# Patient Record
Sex: MALE | Race: ASIAN | NOT HISPANIC OR LATINO | URBAN - METROPOLITAN AREA
[De-identification: names, ages, dates, MRNs, and addresses within clinical notes are randomized per-mention and may not be internally consistent; named-entity substitution may affect disease eponyms.]

---

## 2018-07-01 ENCOUNTER — OUTPATIENT (OUTPATIENT)
Dept: OUTPATIENT SERVICES | Facility: HOSPITAL | Age: 63
LOS: 1 days | End: 2018-07-01
Payer: MEDICAID

## 2018-07-01 DIAGNOSIS — Z98.890 OTHER SPECIFIED POSTPROCEDURAL STATES: Chronic | ICD-10-CM

## 2018-07-01 DIAGNOSIS — Z95.5 PRESENCE OF CORONARY ANGIOPLASTY IMPLANT AND GRAFT: Chronic | ICD-10-CM

## 2018-07-01 PROCEDURE — G9001: CPT

## 2018-07-11 ENCOUNTER — EMERGENCY (EMERGENCY)
Facility: HOSPITAL | Age: 63
LOS: 1 days | Discharge: HOME | End: 2018-07-11
Attending: EMERGENCY MEDICINE | Admitting: EMERGENCY MEDICINE
Payer: COMMERCIAL

## 2018-07-11 VITALS
SYSTOLIC BLOOD PRESSURE: 149 MMHG | DIASTOLIC BLOOD PRESSURE: 83 MMHG | OXYGEN SATURATION: 99 % | RESPIRATION RATE: 16 BRPM | HEART RATE: 68 BPM | TEMPERATURE: 98 F

## 2018-07-11 DIAGNOSIS — Z98.890 OTHER SPECIFIED POSTPROCEDURAL STATES: Chronic | ICD-10-CM

## 2018-07-11 DIAGNOSIS — Z95.5 PRESENCE OF CORONARY ANGIOPLASTY IMPLANT AND GRAFT: Chronic | ICD-10-CM

## 2018-07-11 LAB
ALBUMIN SERPL ELPH-MCNC: 4 G/DL — SIGNIFICANT CHANGE UP (ref 3.3–5)
ALP SERPL-CCNC: 100 U/L — SIGNIFICANT CHANGE UP (ref 40–120)
ALT FLD-CCNC: 24 U/L — SIGNIFICANT CHANGE UP (ref 4–41)
APTT BLD: 30.4 SEC — SIGNIFICANT CHANGE UP (ref 27.5–37.4)
AST SERPL-CCNC: 17 U/L — SIGNIFICANT CHANGE UP (ref 4–40)
BASOPHILS # BLD AUTO: 0.06 K/UL — SIGNIFICANT CHANGE UP (ref 0–0.2)
BASOPHILS NFR BLD AUTO: 0.7 % — SIGNIFICANT CHANGE UP (ref 0–2)
BILIRUB SERPL-MCNC: 0.7 MG/DL — SIGNIFICANT CHANGE UP (ref 0.2–1.2)
BLD GP AB SCN SERPL QL: NEGATIVE — SIGNIFICANT CHANGE UP
BUN SERPL-MCNC: 25 MG/DL — HIGH (ref 7–23)
CALCIUM SERPL-MCNC: 9.1 MG/DL — SIGNIFICANT CHANGE UP (ref 8.4–10.5)
CHLORIDE SERPL-SCNC: 98 MMOL/L — SIGNIFICANT CHANGE UP (ref 98–107)
CO2 SERPL-SCNC: 27 MMOL/L — SIGNIFICANT CHANGE UP (ref 22–31)
CREAT SERPL-MCNC: 1.2 MG/DL — SIGNIFICANT CHANGE UP (ref 0.5–1.3)
EOSINOPHIL # BLD AUTO: 0.24 K/UL — SIGNIFICANT CHANGE UP (ref 0–0.5)
EOSINOPHIL NFR BLD AUTO: 2.8 % — SIGNIFICANT CHANGE UP (ref 0–6)
GLUCOSE SERPL-MCNC: 91 MG/DL — SIGNIFICANT CHANGE UP (ref 70–99)
HCT VFR BLD CALC: 37.3 % — LOW (ref 39–50)
HGB BLD-MCNC: 12.9 G/DL — LOW (ref 13–17)
IMM GRANULOCYTES # BLD AUTO: 0.03 # — SIGNIFICANT CHANGE UP
IMM GRANULOCYTES NFR BLD AUTO: 0.4 % — SIGNIFICANT CHANGE UP (ref 0–1.5)
INR BLD: 1.03 — SIGNIFICANT CHANGE UP (ref 0.88–1.17)
LYMPHOCYTES # BLD AUTO: 1.47 K/UL — SIGNIFICANT CHANGE UP (ref 1–3.3)
LYMPHOCYTES # BLD AUTO: 17.2 % — SIGNIFICANT CHANGE UP (ref 13–44)
MCHC RBC-ENTMCNC: 28.8 PG — SIGNIFICANT CHANGE UP (ref 27–34)
MCHC RBC-ENTMCNC: 34.6 % — SIGNIFICANT CHANGE UP (ref 32–36)
MCV RBC AUTO: 83.3 FL — SIGNIFICANT CHANGE UP (ref 80–100)
MONOCYTES # BLD AUTO: 0.79 K/UL — SIGNIFICANT CHANGE UP (ref 0–0.9)
MONOCYTES NFR BLD AUTO: 9.2 % — SIGNIFICANT CHANGE UP (ref 2–14)
NEUTROPHILS # BLD AUTO: 5.97 K/UL — SIGNIFICANT CHANGE UP (ref 1.8–7.4)
NEUTROPHILS NFR BLD AUTO: 69.7 % — SIGNIFICANT CHANGE UP (ref 43–77)
NRBC # FLD: 0 — SIGNIFICANT CHANGE UP
PLATELET # BLD AUTO: 182 K/UL — SIGNIFICANT CHANGE UP (ref 150–400)
PMV BLD: 10.6 FL — SIGNIFICANT CHANGE UP (ref 7–13)
POTASSIUM SERPL-MCNC: 3.4 MMOL/L — LOW (ref 3.5–5.3)
POTASSIUM SERPL-SCNC: 3.4 MMOL/L — LOW (ref 3.5–5.3)
PROT SERPL-MCNC: 7.7 G/DL — SIGNIFICANT CHANGE UP (ref 6–8.3)
PROTHROM AB SERPL-ACNC: 11.9 SEC — SIGNIFICANT CHANGE UP (ref 9.8–13.1)
RBC # BLD: 4.48 M/UL — SIGNIFICANT CHANGE UP (ref 4.2–5.8)
RBC # FLD: 12.6 % — SIGNIFICANT CHANGE UP (ref 10.3–14.5)
RH IG SCN BLD-IMP: POSITIVE — SIGNIFICANT CHANGE UP
SODIUM SERPL-SCNC: 137 MMOL/L — SIGNIFICANT CHANGE UP (ref 135–145)
WBC # BLD: 8.56 K/UL — SIGNIFICANT CHANGE UP (ref 3.8–10.5)
WBC # FLD AUTO: 8.56 K/UL — SIGNIFICANT CHANGE UP (ref 3.8–10.5)

## 2018-07-11 PROCEDURE — 73700 CT LOWER EXTREMITY W/O DYE: CPT | Mod: 26,LT

## 2018-07-11 PROCEDURE — 99285 EMERGENCY DEPT VISIT HI MDM: CPT | Mod: 25

## 2018-07-11 PROCEDURE — 99282 EMERGENCY DEPT VISIT SF MDM: CPT

## 2018-07-11 PROCEDURE — 70450 CT HEAD/BRAIN W/O DYE: CPT | Mod: 26

## 2018-07-11 PROCEDURE — 93970 EXTREMITY STUDY: CPT | Mod: 26

## 2018-07-11 PROCEDURE — 99281 EMR DPT VST MAYX REQ PHY/QHP: CPT

## 2018-07-11 RX ORDER — MECLIZINE HCL 12.5 MG
25 TABLET ORAL ONCE
Qty: 0 | Refills: 0 | Status: COMPLETED | OUTPATIENT
Start: 2018-07-11 | End: 2018-07-11

## 2018-07-11 RX ORDER — METOCLOPRAMIDE HCL 10 MG
10 TABLET ORAL ONCE
Qty: 0 | Refills: 0 | Status: COMPLETED | OUTPATIENT
Start: 2018-07-11 | End: 2018-07-11

## 2018-07-11 RX ADMIN — Medication 25 MILLIGRAM(S): at 21:14

## 2018-07-11 RX ADMIN — Medication 10 MILLIGRAM(S): at 19:12
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
## 2018-07-11 NOTE — ED ADULT NURSE NOTE - ED STAT RN HANDOFF DETAILS
Pt. received in intake; A&Ox3 at present. Pt. denies pain. Respirations are even and unlabored on room air. Pt. is admitted to Observation Unit. Report given to CDU KIERSTEN Ibarra.
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
## 2018-07-11 NOTE — ED ADULT NURSE NOTE - OBJECTIVE STATEMENT
Pt received to room 10c intake area he is alert and oriented x 3 pt c/o of a right sided headache no c/o of nausea or vomiting pt also c/o of left leg pain pt left leg is noted with swelling pt denies sob or chest pain. An iv was accessed labs sent.
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
## 2018-07-12 VITALS
OXYGEN SATURATION: 98 % | RESPIRATION RATE: 17 BRPM | HEART RATE: 68 BPM | TEMPERATURE: 98 F | DIASTOLIC BLOOD PRESSURE: 75 MMHG | SYSTOLIC BLOOD PRESSURE: 148 MMHG

## 2018-07-12 DIAGNOSIS — S06.5X9A TRAUMATIC SUBDURAL HEMORRHAGE WITH LOSS OF CONSCIOUSNESS OF UNSPECIFIED DURATION, INITIAL ENCOUNTER: ICD-10-CM

## 2018-07-12 PROCEDURE — 70450 CT HEAD/BRAIN W/O DYE: CPT | Mod: 26

## 2018-07-12 PROCEDURE — 99236 HOSP IP/OBS SAME DATE HI 85: CPT
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
## 2018-07-12 NOTE — ED CDU PROVIDER INITIAL DAY NOTE - MEDICAL DECISION MAKING DETAILS
Hold Xarelto, Neuro checks, general observation / reassessment, repeat am CT head, Neurosurgical team reassessment.
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
## 2018-07-12 NOTE — ED CDU PROVIDER INITIAL DAY NOTE - PROGRESS NOTE DETAILS
On repeat neuro exam, pupils asymmetrical: OD pupil 2mm, constricts slowly / slightly; OS pupil 3mm, quickly responsive and constricts to 2 mm.  Pt denies headache at present or vision changes; pt has no c/o at present.  Neurosurgery paged; awaiting call back.  Pt. being signed out at 0700 hrs to the day CDU PA and attending. Per Neurosurg provider covering [id number] pager, notified of pupillary difference and that OD is slower to respond to light stimulation as compared to OS; states "that's fine" and advises to get repeat head CT (ordered and per prior conversation w/ CT tech, coming for pt imminently); Neurosurg team will be reassessing pt this morning.  CDU day team made aware during sign-out. MD RODAS:  Pt states that his ha is improved this am.  Denies vision changes, numbness/tingling/weakness to arms/legs.  Pupils round, L 3mm, R 2mm, reactive to light b/l, eomi, motor 5/5 x4 ext, distal sensory grossly intact x4 ext, normal gait, normal heel to shin, normal finger to nose.  Pending rpt head CT and neurosurg reassessment. MD CHO:  Spoke with neurosurgery PA and attending at bedside.  They are aware of interval head CT changes, state this is expected and recommend discharge with pcp f/u, advise to stop taking xarelto.  Will discuss with pt and his family.  Advise to return immediately if any worsening ha or new sx or any other concern.

## 2018-07-12 NOTE — ED CDU PROVIDER DISPOSITION NOTE - ATTENDING CONTRIBUTION TO CARE
CDU MD RODAS:  I performed a face to face bedside interview with patient regarding history of present illness, review of symptoms and past medical history. I completed an independent physical exam.  I have discussed patient's plan of care with PA.   I agree with note as stated above, having amended the EMR as needed to reflect my findings. I have discussed the assessment and plan of care.  This includes during the time I functioned as the attending physician for this patient.

## 2018-07-12 NOTE — CONSULT NOTE ADULT - PROBLEM SELECTOR RECOMMENDATION 9
No surgical management required  Observe overnight in CDU  Repeat CT in AM  Continue to hold xarelto

## 2018-07-12 NOTE — ED CDU PROVIDER INITIAL DAY NOTE - CONSTITUTIONAL, MLM
normal... Well appearing, well nourished, awake, alert, oriented to person, place, time/situation and in no apparent distress.  Speech clear and effortless.

## 2018-07-12 NOTE — ED CDU PROVIDER INITIAL DAY NOTE - PMH
CAD (coronary artery disease)    HLD (hyperlipidemia)    HTN (hypertension)    Kidney stones CAD (coronary artery disease)    HLD (hyperlipidemia)    HTN (hypertension)    Kidney stones    Subdural hematoma

## 2018-07-23 DIAGNOSIS — Z71.89 OTHER SPECIFIED COUNSELING: ICD-10-CM

## 2018-09-18 PROBLEM — S06.5X9A TRAUMATIC SUBDURAL HEMORRHAGE WITH LOSS OF CONSCIOUSNESS OF UNSPECIFIED DURATION, INITIAL ENCOUNTER: Chronic | Status: ACTIVE | Noted: 2018-07-12

## 2018-10-11 ENCOUNTER — APPOINTMENT (OUTPATIENT)
Dept: VASCULAR SURGERY | Facility: CLINIC | Age: 63
End: 2018-10-11
Payer: MEDICAID

## 2018-10-11 VITALS
DIASTOLIC BLOOD PRESSURE: 103 MMHG | HEIGHT: 66 IN | SYSTOLIC BLOOD PRESSURE: 183 MMHG | WEIGHT: 185 LBS | BODY MASS INDEX: 29.73 KG/M2 | HEART RATE: 78 BPM

## 2018-10-11 DIAGNOSIS — I87.8 OTHER SPECIFIED DISORDERS OF VEINS: ICD-10-CM

## 2018-10-11 DIAGNOSIS — Z86.2 PERSONAL HISTORY OF DISEASES OF THE BLOOD AND BLOOD-FORMING ORGANS AND CERTAIN DISORDERS INVOLVING THE IMMUNE MECHANISM: ICD-10-CM

## 2018-10-11 DIAGNOSIS — Z87.39 PERSONAL HISTORY OF OTHER DISEASES OF THE MUSCULOSKELETAL SYSTEM AND CONNECTIVE TISSUE: ICD-10-CM

## 2018-10-11 DIAGNOSIS — Z87.09 PERSONAL HISTORY OF OTHER DISEASES OF THE RESPIRATORY SYSTEM: ICD-10-CM

## 2018-10-11 DIAGNOSIS — I87.2 VENOUS INSUFFICIENCY (CHRONIC) (PERIPHERAL): ICD-10-CM

## 2018-10-11 DIAGNOSIS — I51.9 HEART DISEASE, UNSPECIFIED: ICD-10-CM

## 2018-10-11 DIAGNOSIS — Z86.79 PERSONAL HISTORY OF OTHER DISEASES OF THE CIRCULATORY SYSTEM: ICD-10-CM

## 2018-10-11 DIAGNOSIS — I83.90 ASYMPTOMATIC VARICOSE VEINS OF UNSPECIFIED LOWER EXTREMITY: ICD-10-CM

## 2018-10-11 DIAGNOSIS — Z86.39 PERSONAL HISTORY OF OTHER ENDOCRINE, NUTRITIONAL AND METABOLIC DISEASE: ICD-10-CM

## 2018-10-11 DIAGNOSIS — Z87.448 PERSONAL HISTORY OF OTHER DISEASES OF URINARY SYSTEM: ICD-10-CM

## 2018-10-11 PROCEDURE — 93970 EXTREMITY STUDY: CPT

## 2018-10-11 PROCEDURE — 99204 OFFICE O/P NEW MOD 45 MIN: CPT

## 2019-03-26 ENCOUNTER — OUTPATIENT (OUTPATIENT)
Dept: OUTPATIENT SERVICES | Facility: HOSPITAL | Age: 64
LOS: 1 days | End: 2019-03-26
Payer: MEDICAID

## 2019-03-26 VITALS
TEMPERATURE: 99 F | OXYGEN SATURATION: 98 % | RESPIRATION RATE: 18 BRPM | WEIGHT: 184.97 LBS | HEIGHT: 67 IN | HEART RATE: 66 BPM | DIASTOLIC BLOOD PRESSURE: 78 MMHG | SYSTOLIC BLOOD PRESSURE: 147 MMHG

## 2019-03-26 DIAGNOSIS — E11.9 TYPE 2 DIABETES MELLITUS WITHOUT COMPLICATIONS: ICD-10-CM

## 2019-03-26 DIAGNOSIS — Z98.890 OTHER SPECIFIED POSTPROCEDURAL STATES: Chronic | ICD-10-CM

## 2019-03-26 DIAGNOSIS — I10 ESSENTIAL (PRIMARY) HYPERTENSION: ICD-10-CM

## 2019-03-26 DIAGNOSIS — Z95.5 PRESENCE OF CORONARY ANGIOPLASTY IMPLANT AND GRAFT: Chronic | ICD-10-CM

## 2019-03-26 DIAGNOSIS — N43.3 HYDROCELE, UNSPECIFIED: ICD-10-CM

## 2019-03-26 DIAGNOSIS — I25.10 ATHEROSCLEROTIC HEART DISEASE OF NATIVE CORONARY ARTERY WITHOUT ANGINA PECTORIS: ICD-10-CM

## 2019-03-26 DIAGNOSIS — Z01.818 ENCOUNTER FOR OTHER PREPROCEDURAL EXAMINATION: ICD-10-CM

## 2019-03-26 LAB
ANION GAP SERPL CALC-SCNC: 9 MMOL/L — SIGNIFICANT CHANGE UP (ref 5–17)
BUN SERPL-MCNC: 32 MG/DL — HIGH (ref 7–18)
CALCIUM SERPL-MCNC: 8.3 MG/DL — LOW (ref 8.4–10.5)
CHLORIDE SERPL-SCNC: 107 MMOL/L — SIGNIFICANT CHANGE UP (ref 96–108)
CO2 SERPL-SCNC: 26 MMOL/L — SIGNIFICANT CHANGE UP (ref 22–31)
CREAT SERPL-MCNC: 1.26 MG/DL — SIGNIFICANT CHANGE UP (ref 0.5–1.3)
GLUCOSE SERPL-MCNC: 135 MG/DL — HIGH (ref 70–99)
HCT VFR BLD CALC: 42.6 % — SIGNIFICANT CHANGE UP (ref 39–50)
HGB BLD-MCNC: 14 G/DL — SIGNIFICANT CHANGE UP (ref 13–17)
MCHC RBC-ENTMCNC: 29.2 PG — SIGNIFICANT CHANGE UP (ref 27–34)
MCHC RBC-ENTMCNC: 32.9 GM/DL — SIGNIFICANT CHANGE UP (ref 32–36)
MCV RBC AUTO: 88.8 FL — SIGNIFICANT CHANGE UP (ref 80–100)
NRBC # BLD: 0 /100 WBCS — SIGNIFICANT CHANGE UP (ref 0–0)
PLATELET # BLD AUTO: 101 K/UL — LOW (ref 150–400)
POTASSIUM SERPL-MCNC: 3.5 MMOL/L — SIGNIFICANT CHANGE UP (ref 3.5–5.3)
POTASSIUM SERPL-SCNC: 3.5 MMOL/L — SIGNIFICANT CHANGE UP (ref 3.5–5.3)
RBC # BLD: 4.8 M/UL — SIGNIFICANT CHANGE UP (ref 4.2–5.8)
RBC # FLD: 13.3 % — SIGNIFICANT CHANGE UP (ref 10.3–14.5)
SODIUM SERPL-SCNC: 142 MMOL/L — SIGNIFICANT CHANGE UP (ref 135–145)
WBC # BLD: 7.96 K/UL — SIGNIFICANT CHANGE UP (ref 3.8–10.5)
WBC # FLD AUTO: 7.96 K/UL — SIGNIFICANT CHANGE UP (ref 3.8–10.5)

## 2019-03-26 PROCEDURE — 93010 ELECTROCARDIOGRAM REPORT: CPT

## 2019-03-26 PROCEDURE — 36415 COLL VENOUS BLD VENIPUNCTURE: CPT

## 2019-03-26 PROCEDURE — 80048 BASIC METABOLIC PNL TOTAL CA: CPT

## 2019-03-26 PROCEDURE — G0463: CPT

## 2019-03-26 PROCEDURE — 93005 ELECTROCARDIOGRAM TRACING: CPT

## 2019-03-26 PROCEDURE — 85027 COMPLETE CBC AUTOMATED: CPT

## 2019-03-26 RX ORDER — GABAPENTIN 400 MG/1
1 CAPSULE ORAL
Qty: 0 | Refills: 0 | COMMUNITY

## 2019-03-26 RX ORDER — IBUPROFEN 200 MG
1 TABLET ORAL
Qty: 0 | Refills: 0 | COMMUNITY

## 2019-03-26 RX ORDER — FONDAPARINUX SODIUM 2.5 MG/.5ML
1 INJECTION, SOLUTION SUBCUTANEOUS
Qty: 0 | Refills: 0 | COMMUNITY

## 2019-03-26 RX ORDER — CLOPIDOGREL BISULFATE 75 MG/1
1 TABLET, FILM COATED ORAL
Qty: 0 | Refills: 0 | COMMUNITY

## 2019-03-26 RX ORDER — BUDESONIDE AND FORMOTEROL FUMARATE DIHYDRATE 160; 4.5 UG/1; UG/1
2 AEROSOL RESPIRATORY (INHALATION)
Qty: 0 | Refills: 0 | COMMUNITY

## 2019-03-26 RX ORDER — TAMSULOSIN HYDROCHLORIDE 0.4 MG/1
1 CAPSULE ORAL
Qty: 0 | Refills: 0 | COMMUNITY

## 2019-03-26 RX ORDER — DONEPEZIL HYDROCHLORIDE 10 MG/1
1 TABLET, FILM COATED ORAL
Qty: 0 | Refills: 0 | COMMUNITY

## 2019-03-26 RX ORDER — IPRATROPIUM/ALBUTEROL SULFATE 18-103MCG
1 AEROSOL WITH ADAPTER (GRAM) INHALATION
Qty: 0 | Refills: 0 | COMMUNITY

## 2019-03-26 RX ORDER — MEMANTINE HYDROCHLORIDE 10 MG/1
1 TABLET ORAL
Qty: 0 | Refills: 0 | COMMUNITY

## 2019-03-26 RX ORDER — SIMVASTATIN 20 MG/1
1 TABLET, FILM COATED ORAL
Qty: 0 | Refills: 0 | COMMUNITY

## 2019-03-26 RX ORDER — METFORMIN HYDROCHLORIDE 850 MG/1
1 TABLET ORAL
Qty: 0 | Refills: 0 | COMMUNITY

## 2019-03-26 RX ORDER — METOPROLOL TARTRATE 50 MG
1 TABLET ORAL
Qty: 0 | Refills: 0 | COMMUNITY

## 2019-03-26 RX ORDER — LOSARTAN/HYDROCHLOROTHIAZIDE 100MG-25MG
1 TABLET ORAL
Qty: 0 | Refills: 0 | COMMUNITY

## 2019-03-26 RX ORDER — ACETAMINOPHEN 500 MG
2 TABLET ORAL
Qty: 0 | Refills: 0 | COMMUNITY

## 2019-03-26 RX ORDER — ALBUTEROL 90 UG/1
2 AEROSOL, METERED ORAL
Qty: 0 | Refills: 0 | COMMUNITY

## 2019-03-26 RX ORDER — AMLODIPINE BESYLATE 2.5 MG/1
1 TABLET ORAL
Qty: 0 | Refills: 0 | COMMUNITY

## 2019-03-26 NOTE — H&P PST ADULT - HISTORY OF PRESENT ILLNESS
63 yr old male with PMH of atrial fibrillation on Xarelto, HTN, HLD, DM, Alzheimer's disease, CAD, coronary stent x 1, prostate cancer treated with CyberKnife radiotherapy, subdural hematoma in 2018 presents with c/o left hydrocele. Pt reports discomfort with ambulation. Pt for left hydrocelectomy on 3/29/2019. 63 yr old male with PMH of atrial fibrillation on Xarelto, HTN, HLD, DM, Alzheimer's disease, CAD, coronary stent x 1, prostate cancer treated with CyberKnife radiotherapy, subdural hematoma in 2018 presents with c/o left hydrocele. Pt reports discomfort with ambulation. Pt for left hydrocelectomy on 3/29/2019.      Patient is taking Xarelto and Plavix, allergic to ASA, has one coronary stent, platelets: 101. Staff spoke to the surgeon, Dr Perry and and Dr Kilpatrick, they both agreed that patient can stop those two medications before surgery and does not need hematology clearance  Spoke patient's Cardiologist, Dr Lares. He stated that, on a cardiac point of view patient can stop the medications, however, patient had an LEV doppler on 8/30/18, there was a non obstructive thrombus in a superficial vein (did not say which vein, "he needs approval from patient to give out the information"), and suggested to have a repeat doppler before surgery to make sure the thrombus is not there. Will inform Dr Kilpatrick 63 yr old male with PMH of atrial fibrillation on Xarelto, HTN, HLD, DM, Alzheimer's disease, CAD, coronary stent x 1, prostate cancer treated with CyberKnife radiotherapy, subdural hematoma in 2018 presents with c/o left hydrocele. Pt reports discomfort with ambulation. Pt for left hydrocelectomy on 3/29/2019.      Patient is taking Xarelto and Plavix, allergic to ASA, has one coronary stent, platelets: 101. Staff spoke to the surgeon, Dr Perry and and Dr Kilpatrick, they both agreed that patient can stop those two medications before surgery and does not need hematology clearance  Spoke to patient's Cardiologist, Dr Lares. He stated that, on a cardiac point of view patient can stop the medications, however, patient had an LEV doppler on 8/30/18, there was a non obstructive thrombus in a superficial vein (did not say which vein, "he needs approval from patient to give out the information"), and suggested to have a repeat doppler before surgery to make sure the thrombus is not there. Spoke to Dr Kilpatrick. Surgery is cancelled till after new doppler is done. Will also call patient to inform him

## 2019-03-26 NOTE — H&P PST ADULT - NSICDXPROBLEM_GEN_ALL_CORE_FT
PROBLEM DIAGNOSES  Problem: DM (diabetes mellitus)  Assessment and Plan:     Problem: Coronary artery disease  Assessment and Plan: Pt on Xarelto for atrial fibrillation and Clopidogrel for stented coronary artery. Instructed by PCP to stop both meds. Allergic to aspirin. Discussed with anesthesiologist Dr Rocha. Will get PCP or cardiologist's advice re: antiplatelet use perioperatively.    Problem: HTN (hypertension)  Assessment and Plan: Instructed pt and daughter to continue taking meds and to take antihypertensives with sips of water on day of surgery. Pt cleared by PCP for surgery.    Problem: Left hydrocele  Assessment and Plan: left hydrocelectomy on 3/29/2019      R/O PROBLEM DIAGNOSES  Problem: Diabetes mellitus  Assessment and Plan: Continue Metformin and hold on day of surgery. Perioperative glucose monitoring and cover as needed.

## 2019-03-26 NOTE — H&P PST ADULT - NEGATIVE CARDIOVASCULAR SYMPTOMS
no paroxysmal nocturnal dyspnea/no peripheral edema/no chest pain/no palpitations/no dyspnea on exertion/no orthopnea/no claudication

## 2019-03-26 NOTE — H&P PST ADULT - NSICDXPASTMEDICALHX_GEN_ALL_CORE_FT
PAST MEDICAL HISTORY:  Alzheimers disease     CAD (coronary artery disease)     Diabetes mellitus     HLD (hyperlipidemia)     HTN (hypertension)     Kidney stones     Prostate cancer     Subdural hematoma PAST MEDICAL HISTORY:  Alzheimers disease     CAD (coronary artery disease)     Complete deafness, left     Diabetes mellitus     HLD (hyperlipidemia)     HTN (hypertension)     Kidney stones     Lower back pain     Partial deafness, right     Prostate cancer treated with cyberknife    Subdural hematoma

## 2019-03-26 NOTE — H&P PST ADULT - NEGATIVE ALLERGY TYPES
no outdoor environmental allergies/no indoor environmental allergies/no reactions to medicines/no reactions to animals/no reactions to insect bites

## 2019-03-26 NOTE — H&P PST ADULT - LAST ECHOCARDIOGRAM
8/14/2018 preserved biventricular systolic function. Aortic sclerosis with trace to mild AI. Mild MI, Trace to mild PI, Mild TR. LVEF 55-60%

## 2019-03-26 NOTE — H&P PST ADULT - NSANTHOSAYNRD_GEN_A_CORE
No. JASEN screening performed.  STOP BANG Legend: 0-2 = LOW Risk; 3-4 = INTERMEDIATE Risk; 5-8 = HIGH Risk

## 2019-03-26 NOTE — H&P PST ADULT - RS GEN PE MLT RESP DETAILS PC
respirations non-labored/airway patent/good air movement/normal/no intercostal retractions/no subcutaneous emphysema/no wheezes/clear to auscultation bilaterally/no rales/no chest wall tenderness/no rhonchi/breath sounds equal

## 2019-03-26 NOTE — H&P PST ADULT - HEARING LOSS
total loss in left ear; partial loss in right ear/L/R L/complete deafness in left ear; partial deafness in right ear/R

## 2019-03-26 NOTE — H&P PST ADULT - NSICDXFAMILYHX_GEN_ALL_CORE_FT
FAMILY HISTORY:  Family history of tuberculosis FAMILY HISTORY:  Family history of breast cancer in sister  Family history of throat cancer  Family history of tuberculosis

## 2019-03-26 NOTE — H&P PST ADULT - LAST CARDIAC ANGIOGRAM/IMAGING
Cardiac Pet 8/2018 mild non-obstructive , diffuse atherosclerosis. Normal LV systolic at rest and during stress.

## 2019-03-26 NOTE — H&P PST ADULT - NSICDXPASTSURGICALHX_GEN_ALL_CORE_FT
PAST SURGICAL HISTORY:  H/O knee surgery     History of heart artery stent PAST SURGICAL HISTORY:  H/O knee surgery     History of heart artery stent     History of nephrolithotomy with removal of calculi left    Stented coronary artery

## 2019-03-26 NOTE — H&P PST ADULT - ASSESSMENT
63 yr old male with PMH of atrial fibrillation on Xarelto, HTN, HLD, DM, Alzheimer's disease, CAD, coronary stent x 1, prostate cancer treated with CyberKnife radiotherapy, subdural hematoma in 2018 presents with left hydrocele.  Pt for left hydrocelectomy on 3/29/2019.

## 2020-12-10 PROBLEM — H91.91 UNSPECIFIED HEARING LOSS, RIGHT EAR: Chronic | Status: ACTIVE | Noted: 2019-03-26

## 2020-12-10 PROBLEM — H91.92 UNSPECIFIED HEARING LOSS, LEFT EAR: Chronic | Status: ACTIVE | Noted: 2019-03-26

## 2020-12-10 PROBLEM — G30.9 ALZHEIMER'S DISEASE, UNSPECIFIED: Chronic | Status: ACTIVE | Noted: 2019-03-26

## 2020-12-10 PROBLEM — M54.5 LOW BACK PAIN: Chronic | Status: ACTIVE | Noted: 2019-03-26

## 2020-12-10 PROBLEM — C61 MALIGNANT NEOPLASM OF PROSTATE: Chronic | Status: ACTIVE | Noted: 2019-03-26

## 2020-12-10 PROBLEM — E11.9 TYPE 2 DIABETES MELLITUS WITHOUT COMPLICATIONS: Chronic | Status: ACTIVE | Noted: 2019-03-26

## 2021-01-04 ENCOUNTER — APPOINTMENT (OUTPATIENT)
Dept: UROLOGY | Facility: CLINIC | Age: 66
End: 2021-01-04
Payer: MEDICAID

## 2021-01-04 VITALS
DIASTOLIC BLOOD PRESSURE: 82 MMHG | WEIGHT: 185 LBS | HEIGHT: 66 IN | OXYGEN SATURATION: 97 % | HEART RATE: 65 BPM | BODY MASS INDEX: 29.73 KG/M2 | SYSTOLIC BLOOD PRESSURE: 183 MMHG | TEMPERATURE: 98.4 F | RESPIRATION RATE: 15 BRPM

## 2021-01-04 DIAGNOSIS — N20.0 CALCULUS OF KIDNEY: ICD-10-CM

## 2021-01-04 DIAGNOSIS — N52.9 MALE ERECTILE DYSFUNCTION, UNSPECIFIED: ICD-10-CM

## 2021-01-04 DIAGNOSIS — N43.3 HYDROCELE, UNSPECIFIED: ICD-10-CM

## 2021-01-04 DIAGNOSIS — Z87.891 PERSONAL HISTORY OF NICOTINE DEPENDENCE: ICD-10-CM

## 2021-01-04 DIAGNOSIS — N40.1 BENIGN PROSTATIC HYPERPLASIA WITH LOWER URINARY TRACT SYMPMS: ICD-10-CM

## 2021-01-04 DIAGNOSIS — C61 MALIGNANT NEOPLASM OF PROSTATE: ICD-10-CM

## 2021-01-04 DIAGNOSIS — R35.1 BENIGN PROSTATIC HYPERPLASIA WITH LOWER URINARY TRACT SYMPMS: ICD-10-CM

## 2021-01-04 PROCEDURE — 99204 OFFICE O/P NEW MOD 45 MIN: CPT

## 2021-01-04 RX ORDER — CLOPIDOGREL 75 MG/1
TABLET, FILM COATED ORAL
Refills: 0 | Status: ACTIVE | COMMUNITY

## 2021-01-04 RX ORDER — METOPROLOL TARTRATE 75 MG/1
TABLET, FILM COATED ORAL
Refills: 0 | Status: ACTIVE | COMMUNITY

## 2021-01-04 RX ORDER — TAMSULOSIN HYDROCHLORIDE 0.4 MG/1
0.4 CAPSULE ORAL
Qty: 90 | Refills: 1 | Status: ACTIVE | COMMUNITY
Start: 2021-01-04 | End: 1900-01-01

## 2021-01-04 NOTE — ASSESSMENT
[FreeTextEntry1] : He is a 65-year-old man who presents with multiple urological issues.  The left hemiscrotum is larger than the right side and does not feel like fluid consistency.  An ultrasound will be done.  It does not appear that he has had a recent imaging study for kidney stones.  However he seems to be asymptomatic and will monitor for now and consider imaging study in the future.  PSA level will be sent.  Urinalysis will be checked.  His main concern is at this time urinary symptoms with urge incontinence.  He does not have a list of medications with him today but it does not appear that he is on any medications for urinary symptoms.  Try Flomax for few weeks and then he will get back to me with his response with his son.  Keep legs elevated before bedtime.

## 2021-01-04 NOTE — REVIEW OF SYSTEMS
[Eyesight Problems] : eyesight problems [Shortness Of Breath] : shortness of breath [Joint Pain] : joint pain [Joint Swelling] : joint swelling [Limb Swelling] : limb swelling [Dizziness] : dizziness [Difficulty Walking] : difficulty walking [Swollen Glands] : swollen glands [Negative] : Endocrine

## 2021-01-04 NOTE — PHYSICAL EXAM
[General Appearance - Well Developed] : well developed [General Appearance - Well Nourished] : well nourished [Normal Appearance] : normal appearance [Well Groomed] : well groomed [General Appearance - In No Acute Distress] : no acute distress [Respiration, Rhythm And Depth] : normal respiratory rhythm and effort [Exaggerated Use Of Accessory Muscles For Inspiration] : no accessory muscle use [Abdomen Soft] : soft [Abdomen Tenderness] : non-tender [Costovertebral Angle Tenderness] : no ~M costovertebral angle tenderness [Urethral Meatus] : meatus normal [Penis Abnormality] : normal circumcised penis [Urinary Bladder Findings] : the bladder was normal on palpation [Scrotum] : the scrotum was normal [Prostate Enlargement] : the prostate was not enlarged [Prostate Tenderness] : the prostate was not tender [No Prostate Nodules] : no prostate nodules [FreeTextEntry1] : Right testicle normal, enlarged left testis not fluid consistency [Normal Station and Gait] : the gait and station were normal for the patient's age [] : no rash [No Focal Deficits] : no focal deficits [Affect] : the affect was normal [Mood] : the mood was normal [Not Anxious] : not anxious [Inguinal Lymph Nodes Enlarged Bilaterally] : inguinal

## 2021-01-04 NOTE — HISTORY OF PRESENT ILLNESS
[FreeTextEntry1] : He is a 65-year-old man who is seen today for initial visit with his son for several urological issues.  He has history of prostate cancer after radiation therapy.  He is complaining of urinary frequency and urge incontinence several times a day.  Nocturia is 2 or 3 times.  There is no hematuria, flank pain or dysuria.  He has history of left-sided hydrocele for many years and about 1 year ago left hydrocelectomy was done.  The left scrotum still is larger than it should be for him.  He is complaining of erectile dysfunction.  He has difficulty maintaining erections.  Residual urine today was minimal.   Previous history included PSA of 23.4 on presentation with high-volume [place] 7 prostate cancer.  He underwent CyberKnife radiation therapy in April 2012.  He has undergone shockwave lithotripsy for kidney stones in the past.  CT scan around 2012 showed a 9 mm left adrenal adenoma, left renal atrophy with kidney stones, bone scan was negative.  He received Lupron injection prior to radiation therapy.

## 2021-01-05 LAB
APPEARANCE: CLEAR
BACTERIA: NEGATIVE
BILIRUBIN URINE: NEGATIVE
BLOOD URINE: NEGATIVE
COLOR: NORMAL
GLUCOSE QUALITATIVE U: ABNORMAL
HYALINE CASTS: 0 /LPF
KETONES URINE: NEGATIVE
LEUKOCYTE ESTERASE URINE: NEGATIVE
MICROSCOPIC-UA: NORMAL
NITRITE URINE: NEGATIVE
PH URINE: 6
PROTEIN URINE: NEGATIVE
PSA SERPL-MCNC: 0.12 NG/ML
RED BLOOD CELLS URINE: 1 /HPF
SPECIFIC GRAVITY URINE: 1.02
SQUAMOUS EPITHELIAL CELLS: 0 /HPF
UROBILINOGEN URINE: NORMAL
WHITE BLOOD CELLS URINE: 0 /HPF

## 2021-04-05 ENCOUNTER — APPOINTMENT (OUTPATIENT)
Dept: UROLOGY | Facility: CLINIC | Age: 66
End: 2021-04-05

## 2022-08-29 NOTE — ED ADULT NURSE NOTE - LANGUAGE ASSISTANCE NEEDED
Pt refusing further care at this time. Pt wishing to leave AMA. Pt advised of all risks up to and including death. AMA form signed. MD made aware.    No-Patient/Caregiver offered and refused free interpretation services.

## 2022-10-31 NOTE — H&P PST ADULT - VISION (WITH CORRECTIVE LENSES IF THE PATIENT USUALLY WEARS THEM):
Tetracycline Counseling: Patient counseled regarding possible photosensitivity and increased risk for sunburn.  Patient instructed to avoid sunlight, if possible.  When exposed to sunlight, patients should wear protective clothing, sunglasses, and sunscreen.  The patient was instructed to call the office immediately if the following severe adverse effects occur:  hearing changes, easy bruising/bleeding, severe headache, or vision changes.  The patient verbalized understanding of the proper use and possible adverse effects of tetracycline.  All of the patient's questions and concerns were addressed. Patient understands to avoid pregnancy while on therapy due to potential birth defects. Normal vision: sees adequately in most situations; can see medication labels, newsprint

## 2023-03-07 NOTE — ED CDU PROVIDER INITIAL DAY NOTE - ENDOCRINE NEGATIVE STATEMENT, MLM
Work, School or  note needed: Yes    New vs Established: Established    Patient would like communication of their results via:        Cell Phone:   Telephone Information:   Mobile [phone]     Okay to leave a message containing results? Yes     no hx/o diabetes and no hx/o thyroid trouble.

## 2024-05-01 NOTE — ED PROVIDER NOTE - ENDOCRINE NEGATIVE STATEMENT, MLM
Francisca not read. Sending letter.       [facility]   Phone: [phone], Option #3       For Pharmacy Admin Tracking Only    Program: [employer]  CPA in place:  No  Gap Closed?: Yes   Time Spent (min): 5   
no diabetes and no thyroid trouble.